# Patient Record
Sex: FEMALE | Race: WHITE | Employment: FULL TIME | URBAN - METROPOLITAN AREA
[De-identification: names, ages, dates, MRNs, and addresses within clinical notes are randomized per-mention and may not be internally consistent; named-entity substitution may affect disease eponyms.]

---

## 2017-11-10 ENCOUNTER — HOSPITAL ENCOUNTER (EMERGENCY)
Facility: HOSPITAL | Age: 22
Discharge: HOME | End: 2017-11-11
Attending: EMERGENCY MEDICINE

## 2017-11-10 ENCOUNTER — APPOINTMENT (OUTPATIENT)
Dept: CT IMAGING | Facility: HOSPITAL | Age: 22
End: 2017-11-10
Attending: EMERGENCY MEDICINE

## 2017-11-10 DIAGNOSIS — S06.0X0D CONCUSSION WITHOUT LOSS OF CONSCIOUSNESS, SUBSEQUENT ENCOUNTER: Primary | ICD-10-CM

## 2017-11-10 PROCEDURE — 99284 EMERGENCY DEPT VISIT MOD MDM: CPT

## 2017-11-10 PROCEDURE — 70450 CT HEAD/BRAIN W/O DYE: CPT | Performed by: EMERGENCY MEDICINE

## 2017-11-10 PROCEDURE — 81025 URINE PREGNANCY TEST: CPT

## 2017-11-10 PROCEDURE — 96372 THER/PROPH/DIAG INJ SC/IM: CPT

## 2017-11-10 RX ORDER — DIPHENHYDRAMINE HCL 25 MG
25 CAPSULE ORAL ONCE
Status: COMPLETED | OUTPATIENT
Start: 2017-11-10 | End: 2017-11-10

## 2017-11-10 RX ORDER — METOCLOPRAMIDE 10 MG/1
10 TABLET ORAL ONCE
Status: COMPLETED | OUTPATIENT
Start: 2017-11-10 | End: 2017-11-10

## 2017-11-11 VITALS
DIASTOLIC BLOOD PRESSURE: 73 MMHG | RESPIRATION RATE: 12 BRPM | OXYGEN SATURATION: 99 % | WEIGHT: 140 LBS | SYSTOLIC BLOOD PRESSURE: 126 MMHG | TEMPERATURE: 98 F | HEIGHT: 66 IN | BODY MASS INDEX: 22.5 KG/M2 | HEART RATE: 83 BPM

## 2017-11-11 RX ORDER — KETOROLAC TROMETHAMINE 30 MG/ML
30 INJECTION, SOLUTION INTRAMUSCULAR; INTRAVENOUS ONCE
Status: COMPLETED | OUTPATIENT
Start: 2017-11-11 | End: 2017-11-11
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
## 2018-03-24 ENCOUNTER — HOSPITAL (OUTPATIENT)
Dept: OTHER | Age: 23
End: 2018-03-24
Attending: EMERGENCY MEDICINE

## 2018-03-24 LAB
ALBUMIN SERPL-MCNC: 3.8 GM/DL (ref 3.6–5.1)
ALBUMIN/GLOB SERPL: 1.2 {RATIO} (ref 1–2.4)
ALP SERPL-CCNC: 47 UNIT/L (ref 45–117)
ALT SERPL-CCNC: 9 UNIT/L
ANALYZER ANC (IANC): NORMAL
ANION GAP SERPL CALC-SCNC: 10 MMOL/L (ref 10–20)
AST SERPL-CCNC: 13 UNIT/L
BASOPHILS # BLD: 0 THOUSAND/MCL (ref 0–0.3)
BASOPHILS NFR BLD: 0 %
BILIRUB SERPL-MCNC: 0.2 MG/DL (ref 0.2–1)
BUN SERPL-MCNC: 14 MG/DL (ref 6–20)
BUN/CREAT SERPL: 14 (ref 7–25)
CALCIUM SERPL-MCNC: 8.7 MG/DL (ref 8.4–10.2)
CHLORIDE: 109 MMOL/L (ref 98–107)
CO2 SERPL-SCNC: 26 MMOL/L (ref 21–32)
CREAT SERPL-MCNC: 1.01 MG/DL (ref 0.51–0.95)
DIFFERENTIAL METHOD BLD: NORMAL
EOSINOPHIL # BLD: 0.2 THOUSAND/MCL (ref 0.1–0.5)
EOSINOPHIL NFR BLD: 3 %
ERYTHROCYTE [DISTWIDTH] IN BLOOD: 12.1 % (ref 11–15)
GLOBULIN SER-MCNC: 3.1 GM/DL (ref 2–4)
GLUCOSE SERPL-MCNC: 72 MG/DL (ref 65–99)
HCG POINT OF CARE (5HGRST): NEGATIVE
HEMATOCRIT: 37.2 % (ref 36–46.5)
HGB BLD-MCNC: 12.7 GM/DL (ref 12–15.5)
LYMPHOCYTES # BLD: 2 THOUSAND/MCL (ref 1–4.8)
LYMPHOCYTES NFR BLD: 35 %
MCH RBC QN AUTO: 29.6 PG (ref 26–34)
MCHC RBC AUTO-ENTMCNC: 34.1 GM/DL (ref 32–36.5)
MCV RBC AUTO: 86.7 FL (ref 78–100)
MONOCYTES # BLD: 0.5 THOUSAND/MCL (ref 0.3–0.9)
MONOCYTES NFR BLD: 9 %
NEUTROPHILS # BLD: 3.1 THOUSAND/MCL (ref 1.8–7.7)
NEUTROPHILS NFR BLD: 53 %
NEUTS SEG NFR BLD: NORMAL %
PERCENT NRBC: NORMAL
PLATELET # BLD: 158 THOUSAND/MCL (ref 140–450)
POTASSIUM SERPL-SCNC: 3.7 MMOL/L (ref 3.4–5.1)
PROT SERPL-MCNC: 6.9 GM/DL (ref 6.4–8.2)
RBC # BLD: 4.29 MILLION/MCL (ref 4–5.2)
SODIUM SERPL-SCNC: 141 MMOL/L (ref 135–145)
TROPONIN I SERPL HS-MCNC: <0.02 NG/ML
WBC # BLD: 5.8 THOUSAND/MCL (ref 4.2–11)

## 2018-03-25 ENCOUNTER — DIAGNOSTIC TRANS (OUTPATIENT)
Dept: OTHER | Age: 23
End: 2018-03-25

## 2018-03-25 LAB — TROPONIN I SERPL HS-MCNC: <0.02 NG/ML

## 2019-01-21 PROCEDURE — 88175 CYTOPATH C/V AUTO FLUID REDO: CPT | Performed by: OBSTETRICS & GYNECOLOGY

## 2021-08-15 ENCOUNTER — HOSPITAL ENCOUNTER (EMERGENCY)
Age: 26
Discharge: HOME | End: 2021-08-15
Attending: EMERGENCY MEDICINE

## 2021-08-15 ENCOUNTER — APPOINTMENT (OUTPATIENT)
Dept: GENERAL RADIOLOGY | Age: 26
End: 2021-08-15
Attending: EMERGENCY MEDICINE

## 2021-08-15 VITALS
DIASTOLIC BLOOD PRESSURE: 64 MMHG | HEART RATE: 68 BPM | SYSTOLIC BLOOD PRESSURE: 106 MMHG | HEIGHT: 67 IN | RESPIRATION RATE: 18 BRPM | BODY MASS INDEX: 20.03 KG/M2 | TEMPERATURE: 98.2 F | OXYGEN SATURATION: 97 % | WEIGHT: 127.65 LBS

## 2021-08-15 DIAGNOSIS — R07.9 ACUTE CHEST PAIN: Primary | ICD-10-CM

## 2021-08-15 LAB
ALBUMIN SERPL-MCNC: 3.4 G/DL (ref 3.6–5.1)
ALBUMIN/GLOB SERPL: 1 {RATIO} (ref 1–2.4)
ALP SERPL-CCNC: 31 UNITS/L (ref 45–117)
ALT SERPL-CCNC: 10 UNITS/L
ANION GAP SERPL CALC-SCNC: 7 MMOL/L (ref 10–20)
AST SERPL-CCNC: 8 UNITS/L
ATRIAL RATE (BPM): 74
BASOPHILS # BLD: 0.1 K/MCL (ref 0–0.3)
BASOPHILS NFR BLD: 1 %
BILIRUB SERPL-MCNC: 0.2 MG/DL (ref 0.2–1)
BUN SERPL-MCNC: 16 MG/DL (ref 6–20)
BUN/CREAT SERPL: 16 (ref 7–25)
CALCIUM SERPL-MCNC: 8 MG/DL (ref 8.4–10.2)
CHLORIDE SERPL-SCNC: 108 MMOL/L (ref 98–107)
CO2 SERPL-SCNC: 26 MMOL/L (ref 21–32)
CREAT SERPL-MCNC: 0.99 MG/DL (ref 0.51–0.95)
D DIMER PPP FEU-MCNC: 0.42 MG/L (FEU)
DEPRECATED RDW RBC: 39.4 FL (ref 39–50)
EOSINOPHIL # BLD: 0.1 K/MCL (ref 0–0.5)
EOSINOPHIL NFR BLD: 2 %
ERYTHROCYTE [DISTWIDTH] IN BLOOD: 12 % (ref 11–15)
FASTING DURATION TIME PATIENT: ABNORMAL H
GFR SERPLBLD BASED ON 1.73 SQ M-ARVRAT: 79 ML/MIN/1.73M2
GLOBULIN SER-MCNC: 3.3 G/DL (ref 2–4)
GLUCOSE SERPL-MCNC: 80 MG/DL (ref 65–99)
HCT VFR BLD CALC: 36.9 % (ref 36–46.5)
HGB BLD-MCNC: 12.4 G/DL (ref 12–15.5)
IMM GRANULOCYTES # BLD AUTO: 0 K/MCL (ref 0–0.2)
IMM GRANULOCYTES # BLD: 0 %
LIPASE SERPL-CCNC: 127 UNITS/L (ref 73–393)
LYMPHOCYTES # BLD: 2.1 K/MCL (ref 1–4.8)
LYMPHOCYTES NFR BLD: 35 %
MCH RBC QN AUTO: 30.2 PG (ref 26–34)
MCHC RBC AUTO-ENTMCNC: 33.6 G/DL (ref 32–36.5)
MCV RBC AUTO: 90 FL (ref 78–100)
MONOCYTES # BLD: 0.7 K/MCL (ref 0.3–0.9)
MONOCYTES NFR BLD: 11 %
NEUTROPHILS # BLD: 3.1 K/MCL (ref 1.8–7.7)
NEUTROPHILS NFR BLD: 51 %
NRBC BLD MANUAL-RTO: 0 /100 WBC
P AXIS (DEGREES): 80
PLATELET # BLD AUTO: 185 K/MCL (ref 140–450)
POTASSIUM SERPL-SCNC: 3.7 MMOL/L (ref 3.4–5.1)
PR-INTERVAL (MSEC): 164
PROT SERPL-MCNC: 6.7 G/DL (ref 6.4–8.2)
QRS-INTERVAL (MSEC): 90
QT-INTERVAL (MSEC): 382
QTC: 424
R AXIS (DEGREES): 80
RBC # BLD: 4.1 MIL/MCL (ref 4–5.2)
REPORT TEXT: NORMAL
SODIUM SERPL-SCNC: 137 MMOL/L (ref 135–145)
T AXIS (DEGREES): 24
VENTRICULAR RATE EKG/MIN (BPM): 74
WBC # BLD: 6.1 K/MCL (ref 4.2–11)

## 2021-08-15 PROCEDURE — 80053 COMPREHEN METABOLIC PANEL: CPT | Performed by: EMERGENCY MEDICINE

## 2021-08-15 PROCEDURE — 83690 ASSAY OF LIPASE: CPT | Performed by: EMERGENCY MEDICINE

## 2021-08-15 PROCEDURE — 36415 COLL VENOUS BLD VENIPUNCTURE: CPT

## 2021-08-15 PROCEDURE — 71046 X-RAY EXAM CHEST 2 VIEWS: CPT

## 2021-08-15 PROCEDURE — 10002803 HB RX 637: Performed by: EMERGENCY MEDICINE

## 2021-08-15 PROCEDURE — 85025 COMPLETE CBC W/AUTO DIFF WBC: CPT | Performed by: EMERGENCY MEDICINE

## 2021-08-15 PROCEDURE — 93010 ELECTROCARDIOGRAM REPORT: CPT | Performed by: INTERNAL MEDICINE

## 2021-08-15 PROCEDURE — 85379 FIBRIN DEGRADATION QUANT: CPT | Performed by: EMERGENCY MEDICINE

## 2021-08-15 PROCEDURE — 99284 EMERGENCY DEPT VISIT MOD MDM: CPT | Performed by: EMERGENCY MEDICINE

## 2021-08-15 PROCEDURE — 93005 ELECTROCARDIOGRAM TRACING: CPT | Performed by: EMERGENCY MEDICINE

## 2021-08-15 PROCEDURE — 99285 EMERGENCY DEPT VISIT HI MDM: CPT

## 2021-08-15 PROCEDURE — 10004651 HB RX, NO CHARGE ITEM: Performed by: EMERGENCY MEDICINE

## 2021-08-15 RX ORDER — ACETAMINOPHEN 325 MG/1
975 TABLET ORAL ONCE
Status: COMPLETED | OUTPATIENT
Start: 2021-08-15 | End: 2021-08-15

## 2021-08-15 RX ORDER — IBUPROFEN 200 MG
400 TABLET ORAL ONCE
Status: COMPLETED | OUTPATIENT
Start: 2021-08-15 | End: 2021-08-15

## 2021-08-15 RX ADMIN — ACETAMINOPHEN 975 MG: 325 TABLET ORAL at 04:31

## 2021-08-15 RX ADMIN — IBUPROFEN 400 MG: 200 TABLET, FILM COATED ORAL at 04:31

## 2021-08-15 ASSESSMENT — PAIN DESCRIPTION - PAIN TYPE: TYPE: CHEST PAIN

## 2021-08-15 ASSESSMENT — ENCOUNTER SYMPTOMS
SHORTNESS OF BREATH: 0
DIAPHORESIS: 0
COLOR CHANGE: 0
ABDOMINAL PAIN: 1

## 2021-08-15 ASSESSMENT — PAIN SCALES - GENERAL: PAINLEVEL_OUTOF10: 9

## (undated) NOTE — ED AVS SNAPSHOT
Shannon Abernathy   MRN: [id number]    Department:  [facility] Emergency Department   Date of Visit:  11/10/2017           Disclosure     Insurance plans vary and the physician(s) referred by the ER may not be covered by your plan.  Please contact CARE PHYSICIAN AT ONCE OR RETURN IMMEDIATELY TO THE EMERGENCY DEPARTMENT. If you have been prescribed any medication(s), please fill your prescription right away and begin taking the medication(s) as directed.   If you believe that any of the medications